# Patient Record
Sex: MALE | Race: WHITE | NOT HISPANIC OR LATINO | Employment: FULL TIME | URBAN - METROPOLITAN AREA
[De-identification: names, ages, dates, MRNs, and addresses within clinical notes are randomized per-mention and may not be internally consistent; named-entity substitution may affect disease eponyms.]

---

## 2024-02-22 ENCOUNTER — APPOINTMENT (EMERGENCY)
Dept: RADIOLOGY | Facility: HOSPITAL | Age: 29
End: 2024-02-22
Payer: OTHER MISCELLANEOUS

## 2024-02-22 ENCOUNTER — HOSPITAL ENCOUNTER (EMERGENCY)
Facility: HOSPITAL | Age: 29
Discharge: HOME | End: 2024-02-22
Attending: EMERGENCY MEDICINE
Payer: OTHER MISCELLANEOUS

## 2024-02-22 VITALS
WEIGHT: 231.48 LBS | RESPIRATION RATE: 18 BRPM | HEIGHT: 73 IN | TEMPERATURE: 98.4 F | DIASTOLIC BLOOD PRESSURE: 76 MMHG | SYSTOLIC BLOOD PRESSURE: 130 MMHG | OXYGEN SATURATION: 99 % | BODY MASS INDEX: 30.68 KG/M2 | HEART RATE: 89 BPM

## 2024-02-22 DIAGNOSIS — S65.312A LACERATION OF DEEP PALMAR ARCH OF LEFT HAND, INITIAL ENCOUNTER: Primary | ICD-10-CM

## 2024-02-22 PROCEDURE — 99284 EMERGENCY DEPT VISIT MOD MDM: CPT | Performed by: EMERGENCY MEDICINE

## 2024-02-22 PROCEDURE — 99283 EMERGENCY DEPT VISIT LOW MDM: CPT

## 2024-02-22 PROCEDURE — 90715 TDAP VACCINE 7 YRS/> IM: CPT | Performed by: EMERGENCY MEDICINE

## 2024-02-22 PROCEDURE — 90471 IMMUNIZATION ADMIN: CPT

## 2024-02-22 PROCEDURE — 73130 X-RAY EXAM OF HAND: CPT

## 2024-02-22 RX ORDER — AMOXICILLIN AND CLAVULANATE POTASSIUM 875; 125 MG/1; MG/1
1 TABLET, FILM COATED ORAL ONCE
Status: COMPLETED | OUTPATIENT
Start: 2024-02-22 | End: 2024-02-22

## 2024-02-22 RX ORDER — AMOXICILLIN AND CLAVULANATE POTASSIUM 875; 125 MG/1; MG/1
1 TABLET, FILM COATED ORAL 2 TIMES DAILY
Qty: 14 TABLET | Refills: 0 | Status: SHIPPED | OUTPATIENT
Start: 2024-02-22 | End: 2024-02-29

## 2024-02-22 RX ADMIN — AMOXICILLIN AND CLAVULANATE POTASSIUM 1 TABLET: 875; 125 TABLET, FILM COATED ORAL at 11:51

## 2024-02-22 RX ADMIN — TETANUS TOXOID, REDUCED DIPHTHERIA TOXOID AND ACELLULAR PERTUSSIS VACCINE, ADSORBED 0.5 ML: 5; 2.5; 8; 8; 2.5 SUSPENSION INTRAMUSCULAR at 11:56

## 2024-02-22 NOTE — DISCHARGE INSTRUCTIONS
As we discussed wear the splint for comfort but I do want you taking it off and soaking your hand in warm soapy water multiple times per day, please return for any signs of infection.  Please take antibiotics as prescribed.

## 2024-02-22 NOTE — Clinical Note
Prosper Correa was seen and treated in our emergency department on 2/22/2024.        No work until cleared by Family Doctor/Orthopedics    no use of left hand until cleared    Diagnosis:     Prosper  .    He may return on this date:          If you have any questions or concerns, please don't hesitate to call.      Shorty Gil RN    ______________________________           _______________          _______________  Hospital Representative                              Date                                Time
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
## 2024-02-23 ENCOUNTER — TELEPHONE (OUTPATIENT)
Age: 29
End: 2024-02-23

## 2024-02-23 ENCOUNTER — TELEPHONE (OUTPATIENT)
Dept: OBGYN CLINIC | Facility: MEDICAL CENTER | Age: 29
End: 2024-02-23

## 2024-02-23 NOTE — TELEPHONE ENCOUNTER
Hello,  Please advise if the following patient can be forced onto the schedule:    Patient: Prosper Vora     : 1995    MRN: [id number]    Call back #: [phone]    Insurance: W/C [id number]    Reason for appointment: L hand injury    Requested doctor/location: [facility]      Thank you.
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
## 2024-02-27 ENCOUNTER — TELEPHONE (OUTPATIENT)
Age: 29
End: 2024-02-27

## 2024-02-27 ENCOUNTER — OFFICE VISIT (OUTPATIENT)
Dept: OBGYN CLINIC | Facility: CLINIC | Age: 29
End: 2024-02-27
Payer: OTHER MISCELLANEOUS

## 2024-02-27 VITALS — WEIGHT: 231 LBS | BODY MASS INDEX: 30.62 KG/M2 | HEIGHT: 73 IN

## 2024-02-27 DIAGNOSIS — S61.412A STAB WOUND OF LEFT HAND, INITIAL ENCOUNTER: Primary | ICD-10-CM

## 2024-02-27 PROCEDURE — 99204 OFFICE O/P NEW MOD 45 MIN: CPT | Performed by: STUDENT IN AN ORGANIZED HEALTH CARE EDUCATION/TRAINING PROGRAM

## 2024-02-27 NOTE — LETTER
February 27, 2024     Patient: Prosper Correa  YOB: 1995  Date of Visit: 2/27/2024      To Whom it May Concern:    Prosper Correa is under my professional care. Prosper was seen in my office on 2/27/2024. Prosper will return to work 3/4/2024, light duty no lifting, pushing, or pulling with left upper extremity.     If you have any questions or concerns, please don't hesitate to call.         Sincerely,          Koby Barba MD        CC: No Recipients

## 2024-02-27 NOTE — LETTER
February 27, 2024     Patient: Prosper Correa  YOB: 1995  Date of Visit: 2/27/2024      To Whom it May Concern:    Prosper Correa is under my professional care. Prosper was seen in my office on 2/27/2024. Prosper can return to work with no use of the left upper extremity until cleared by my office.     If you have any questions or concerns, please don't hesitate to call.         Sincerely,          Koby Barba MD        CC: No Recipients

## 2024-02-27 NOTE — TELEPHONE ENCOUNTER
Caller: Clarke    Doctor/Office: Jameson    CB#:       What needs to be faxed: work letter    ATTN to: Clarke    Fax#: [phone]      Documents were successfully e-faxed
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
## 2024-03-08 ENCOUNTER — OFFICE VISIT (OUTPATIENT)
Dept: OBGYN CLINIC | Facility: CLINIC | Age: 29
End: 2024-03-08
Payer: OTHER MISCELLANEOUS

## 2024-03-08 ENCOUNTER — TELEPHONE (OUTPATIENT)
Dept: OBGYN CLINIC | Facility: HOSPITAL | Age: 29
End: 2024-03-08

## 2024-03-08 VITALS — WEIGHT: 231 LBS | BODY MASS INDEX: 30.62 KG/M2 | HEIGHT: 73 IN

## 2024-03-08 DIAGNOSIS — S61.412D STAB WOUND OF LEFT HAND, SUBSEQUENT ENCOUNTER: Primary | ICD-10-CM

## 2024-03-08 PROCEDURE — 99213 OFFICE O/P EST LOW 20 MIN: CPT | Performed by: STUDENT IN AN ORGANIZED HEALTH CARE EDUCATION/TRAINING PROGRAM

## 2024-03-08 NOTE — TELEPHONE ENCOUNTER
Caller: [employer]    Doctor/Office: Jameson      What needs to be faxed: Work Status    ATTN to: Clarke    Fax#: [phone]      Documents were successfully e-faxed

## 2024-03-08 NOTE — LETTER
March 8, 2024     Patient: Prosper Correa  YOB: 1995  Date of Visit: 3/8/2024      To Whom it May Concern:    Prosper Correa is under my professional care. Prosper was seen in my office on 3/8/2024. Prosper may return to work on light duty. We will re-evaluate in one week at follow up.     If you have any questions or concerns, please don't hesitate to call.         Sincerely,          Koby Barba MD
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
## 2024-03-15 ENCOUNTER — OFFICE VISIT (OUTPATIENT)
Dept: OBGYN CLINIC | Facility: CLINIC | Age: 29
End: 2024-03-15
Payer: OTHER MISCELLANEOUS

## 2024-03-15 VITALS — HEIGHT: 73 IN | WEIGHT: 231 LBS | BODY MASS INDEX: 30.62 KG/M2

## 2024-03-15 DIAGNOSIS — S61.412D STAB WOUND OF LEFT HAND, SUBSEQUENT ENCOUNTER: Primary | ICD-10-CM

## 2024-03-15 PROCEDURE — 99213 OFFICE O/P EST LOW 20 MIN: CPT | Performed by: STUDENT IN AN ORGANIZED HEALTH CARE EDUCATION/TRAINING PROGRAM
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
## 2024-03-15 NOTE — LETTER
March 15, 2024     Patient: Prosper Correa  YOB: 1995  Date of Visit: 3/15/2024      To Whom it May Concern:    Prosper Correa is under my professional care. Prosper was seen in my office on 3/15/2024. Prosper may return to work full duty with no restrictions.     If you have any questions or concerns, please don't hesitate to call.         Sincerely,          Koby Barba MD

## 2024-03-19 ENCOUNTER — TELEPHONE (OUTPATIENT)
Age: 29
End: 2024-03-19

## 2024-03-19 NOTE — TELEPHONE ENCOUNTER
Caller: Eleonora alonso/ [facility]    Doctor: Jameson    Reason for call: Calling to confirm if patient was seen for PT initial visit 3/12/24.  Advised it appears to have been an external visit with [facility].    No further action at this time.    Call back#: N/A

## 2024-03-19 NOTE — TELEPHONE ENCOUNTER
Caller: Magdalena at [facility]     Doctor: Dr Barba     Reason for call: Magdalena the  at [facility] is calling in wanting to obtain the office notes from 3/8 and 3/15 faxed over to her at [phone]    Call back#: [phone]